# Patient Record
Sex: FEMALE | Race: OTHER | NOT HISPANIC OR LATINO
[De-identification: names, ages, dates, MRNs, and addresses within clinical notes are randomized per-mention and may not be internally consistent; named-entity substitution may affect disease eponyms.]

---

## 2021-01-11 ENCOUNTER — APPOINTMENT (OUTPATIENT)
Dept: PLASTIC SURGERY | Facility: CLINIC | Age: 56
End: 2021-01-11

## 2021-01-11 ENCOUNTER — OUTPATIENT (OUTPATIENT)
Dept: OUTPATIENT SERVICES | Facility: HOSPITAL | Age: 56
LOS: 1 days | End: 2021-01-11

## 2021-01-11 DIAGNOSIS — Z41.9 ENCOUNTER FOR PROCEDURE FOR PURPOSES OTHER THAN REMEDYING HEALTH STATE, UNSPECIFIED: ICD-10-CM

## 2021-01-11 PROBLEM — Z00.00 ENCOUNTER FOR PREVENTIVE HEALTH EXAMINATION: Status: ACTIVE | Noted: 2021-01-11

## 2021-01-11 NOTE — HISTORY OF PRESENT ILLNESS
[FreeTextEntry1] : [facility] Consultation\par \par ID\par 55-year-old, healthy BRCA+ female with right breast cancer. \par \par Cancer Profile:\par Pathology: Right Invasive Ductal Carcinoma (U/S-guided core needle)\par Imaging: MRI shows primary 1.2cm deep to clip and separate 1cm mass lateral to primary. Staging CT/PE pending\par Genetics: BRCA2+\par Tentative Plan: Bilateral Nipple Sparing Mastectomy, Right axillary lymph node biopsy and immediate breast reconstruction. \par \par HISTORY OF PRESENTING ILLNESS\par This patient discovered a right breast lump while in the shower 2 months ago and brought it to the attention of her family physician. A mammogram and ultrasound followed demonstrating a mass. Subsequent workup demonstrated invasive ductal carcinoma in the context of BRCA2 positivity. \par \par She was referred to Dr. Damon’s office where a plan was made to proceed with right therapeutic and left prophylactic nipple sparing mastectomy, in addition to right axillary lymph node dissection. She was then referred to our clinic to discuss her reconstructive options. \par \par PAST MEDICAL HISTORY: \par Hypertension (controlled medically)\par \par PAST SURGICAL HISTORY: \par Bilateral Breast Reduction (wise pattern)- 2004\par Jackeline De Lis Abdominoplasty and abdominal and back liposuction- 2004\par Umbilical hernia repair- 2017\par \par MEDICATIONS:\par Losartan\par \par ALLERGIES: \par None\par \par SOCIAL HISTORY: \par She is a nonsmoker. She smoked in the past but quit 3 years ago. No drug history and recreational drinking history. She is single with two female children (aged 37 and 21, both healthy and have not undergone BRCA testing). 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 Answers questions appropriately with the aid of a . Appears emotionally stable. \par OVERALL BREAST EXAMINATION\par Axillary lymphadenopathy	None palpable\par Evidence of Breast Mass	Palpable mass right breast\par Chest wall shape abnormality\par (pectus carinatum/excavatum, scoliosis, hemithorax asymmetry)	No abnormalities\par Breast Abnormality	Wise pattern scars, wide IMF scars, upper pole involution, bilateral dog ears to IMF incisions, bilateral axillary breast rolls\par Asymmetry	Right > Left\par Soft Tissue Quality	Normal\par Superior Pole Involution	Moderate/Severe\par Ptosis	Grade I\par Cleavage	Wide (cm)\par Areola Size	Small (3cm diameter)\par INDIVIDUAL BREAST TISSUE CHARACTERISTICS\par Linear Base Width	Left 14cm, Right 14cm\par Soft Tissue Pinch Test – Upper Pole 	Left 2cm, Right 2cm\par Anterior Pull Skin Stretch (<2cm= tight envelope)	Left 3cm, Right 3cm- Compliant skin envelope\par Nipple to IMF – Rest 	Left 11cm Right 11cm\par BREAST MEASURMENTS\par Sternal angle to nipple:	Left 24.5cm, Right 25cm\par Mid clavicular to nipple:	Left 25cm, Right 25cm\par Inter-nipple distance:	27 cm\par DONOR SITES\par Abdomen	Unusable- jackeline de lis abdominoplasty and insufficient tissue.\par Upper thighs	Ample fat, no scars. \par Back	Palpable Lats, good overlying skin quality. Paraspinal liposuction scars. \par \par IMAGING:\par Staging CT/PE Pending\par Concordant U/S and MRI right breast mass findings 11:00/12:00\par \par IMPRESSION/PLAN: \par \par This patient is a very pleasant Ghanaian speaking 55-year-old female who was referred to us by the breast surgery team for immediate breast reconstruction following a bilateral nipple sparing mastectomy (right therapeutic for IDC, left prophylactic for BRCA2). She is here today to learn about her reconstructive options. \par \par We met for over an hour today and discussed her reconstructive options in detail. Specifically, I went over the options of 1) closure of the breast envelope without reconstruction 2) Alloplastic, implant-based reconstruction and 3) Autologous, flap-based reconstruction. \par \par We began by going over the advantages and disadvantages of immediate vs delayed breast reconstruction. We explained that immediate breast reconstruction is beneficial because it preserves that native breast footprint and IMF which could potentially lead to better aesthetic result postoperatively. It is also beneficial because of 1 general anesthetic instead of 2, a faster time to completion of reconstruction and not having to live with a mastectomy defect. Disadvantages include the risk of mastectomy flap necrosis, excessive skin/nipple malposition in large breasts, delayed assessment of margins, unknown need for post mastectomy radiation and the potential for wounds delaying adjuvant therapy. Lastly, she was told that the complication rate is higher compared to delayed breast reconstruction. She demonstrated appropriate understanding of the risks and benefits and indicated to me that she wanted to proceed with immediate breast reconstruction. \par \par She was then described in detail the advantages and disadvantages of 1 stage for 2 stage and pre vs sub pectoral alloplastic breast reconstruction. We explained that direct-to-implant breast reconstruction is associated with a higher complication and secondary revision rate than tissue expander to implant reconstruction. More specifically, there is increased risk for mastectomy flap necrosis, seroma, infection and revisionary surgery. She was made aware the of our plan to use acellular dermal matrix, a cadaveric skin substitute, with inherent risks such as infection and seroma. Lastly, we went over the risks of using alloplastic devices. These included, but were not limited to: breast pain/breast implant illness, breast asymmetry, implant rupture, wrinkling/rippling, implant palpability/visibility, capsular contracture, wound dehiscence/implant exposure, abnormal scarring, changes to breast and nipple sensation, animation deformity, the need for explantation/revisionary surgery and the rare association of SHARON-ALCL in the context of textured devices. Lastly, we went over the FDA suggestion of an MRI 3 years following placement of silicone implants and up to every 2 years thereafter to monitor for implant rupture. She also indicated that she is aware that all breast implants eventually require replacement, which are thought to occur around 10 years, but can happen before or after this. \par \par She was then described in detail the advantages and disadvantages of autologous reconstruction. As she has undergone an abdominoplasty and therefore not a candidate for a VIKI Flap, our discussion pertained specifically to the profunda artery  (PAP) flap. I indicated to her that the procedure involves a longer surgery, hospital stay and postoperative recovery then implant based reconstruction. I explained that we will not be able to attain the same breast volume she has presently with the PAP flap alone and this will have to be augmented with fat grafting at a second stage. I then went over risks of the procedure which included, but was not limited to, flap failure, flap takeback, flap revision, pneumothorax, DVT/PE, breast asymmetry, fat necrosis, oil cysts, donor wound healing complications, unaesthetic donor site scars, seroma, hematoma and infection. \par \par Following this information, the patient indicated that she wanted to proceed with bilateral PAP flap reconstruction. She indicated she understood the risks of the procedure and displayed appropriate expectations about the aesthetic result attainable. We will follow up with her after her staging CT/PE and CT angiogram of the lower extremity for her final preoperative visit. At  this point we will answer any final questions and review of operative date and perioperative plan. \par \par Plan Summary\par 1.	Bilateral nipple sparing mastectomy (right therapeutic for IDC, left prophylactic for BRCA2+ and Right axillary lymph node dissection\par 2.	Bilateral PAP flap Breast Reconstruction\par \par Dictated by:\par Dr. French Salcido \par Plastics and Reconstructive Surgery\par [facility]

## 2021-01-12 PROBLEM — Z41.9 ELECTIVE SURGERY: Status: ACTIVE | Noted: 2021-01-12

## 2021-01-20 ENCOUNTER — TRANSCRIPTION ENCOUNTER (OUTPATIENT)
Age: 56
End: 2021-01-20

## 2021-01-20 ENCOUNTER — OUTPATIENT (OUTPATIENT)
Dept: OUTPATIENT SERVICES | Facility: HOSPITAL | Age: 56
LOS: 1 days | End: 2021-01-20
Payer: MEDICAID

## 2021-01-20 VITALS
HEART RATE: 68 BPM | OXYGEN SATURATION: 98 % | HEIGHT: 67 IN | SYSTOLIC BLOOD PRESSURE: 127 MMHG | RESPIRATION RATE: 18 BRPM | TEMPERATURE: 98 F | DIASTOLIC BLOOD PRESSURE: 76 MMHG | WEIGHT: 187.39 LBS

## 2021-01-20 DIAGNOSIS — Z98.890 OTHER SPECIFIED POSTPROCEDURAL STATES: Chronic | ICD-10-CM

## 2021-01-20 DIAGNOSIS — Z01.89 ENCOUNTER FOR OTHER SPECIFIED SPECIAL EXAMINATIONS: ICD-10-CM

## 2021-01-20 DIAGNOSIS — Z41.9 ENCOUNTER FOR PROCEDURE FOR PURPOSES OTHER THAN REMEDYING HEALTH STATE, UNSPECIFIED: Chronic | ICD-10-CM

## 2021-01-20 PROCEDURE — A9541: CPT

## 2021-01-20 PROCEDURE — 78195 LYMPH SYSTEM IMAGING: CPT

## 2021-01-20 PROCEDURE — 78195 LYMPH SYSTEM IMAGING: CPT | Mod: 26

## 2021-01-20 NOTE — PATIENT PROFILE ADULT - STATED REASON FOR ADMISSION
Bilateral mastectomy Simple complete Mathews node Dissection Inject Blue dye full axillary Dissection

## 2021-01-21 ENCOUNTER — RESULT REVIEW (OUTPATIENT)
Age: 56
End: 2021-01-21

## 2021-01-21 ENCOUNTER — NON-APPOINTMENT (OUTPATIENT)
Age: 56
End: 2021-01-21

## 2021-01-21 ENCOUNTER — INPATIENT (INPATIENT)
Facility: HOSPITAL | Age: 56
LOS: 2 days | Discharge: HOME HEALTH | DRG: 581 | End: 2021-01-24
Attending: SURGERY | Admitting: SURGERY
Payer: MEDICAID

## 2021-01-21 DIAGNOSIS — Z41.9 ENCOUNTER FOR PROCEDURE FOR PURPOSES OTHER THAN REMEDYING HEALTH STATE, UNSPECIFIED: Chronic | ICD-10-CM

## 2021-01-21 DIAGNOSIS — Z98.890 OTHER SPECIFIED POSTPROCEDURAL STATES: Chronic | ICD-10-CM

## 2021-01-21 LAB
ANION GAP SERPL CALC-SCNC: 12 MMOL/L — SIGNIFICANT CHANGE UP (ref 5–17)
BUN SERPL-MCNC: 11 MG/DL — SIGNIFICANT CHANGE UP (ref 7–23)
CALCIUM SERPL-MCNC: 8.9 MG/DL — SIGNIFICANT CHANGE UP (ref 8.4–10.5)
CHLORIDE SERPL-SCNC: 107 MMOL/L — SIGNIFICANT CHANGE UP (ref 96–108)
CO2 SERPL-SCNC: 24 MMOL/L — SIGNIFICANT CHANGE UP (ref 22–31)
CREAT SERPL-MCNC: 0.68 MG/DL — SIGNIFICANT CHANGE UP (ref 0.5–1.3)
GLUCOSE SERPL-MCNC: 178 MG/DL — HIGH (ref 70–99)
HCT VFR BLD CALC: 34.1 % — LOW (ref 34.5–45)
HGB BLD-MCNC: 11 G/DL — LOW (ref 11.5–15.5)
MCHC RBC-ENTMCNC: 29.2 PG — SIGNIFICANT CHANGE UP (ref 27–34)
MCHC RBC-ENTMCNC: 32.3 GM/DL — SIGNIFICANT CHANGE UP (ref 32–36)
MCV RBC AUTO: 90.5 FL — SIGNIFICANT CHANGE UP (ref 80–100)
NRBC # BLD: 0 /100 WBCS — SIGNIFICANT CHANGE UP (ref 0–0)
PLATELET # BLD AUTO: 232 K/UL — SIGNIFICANT CHANGE UP (ref 150–400)
POTASSIUM SERPL-MCNC: 4.3 MMOL/L — SIGNIFICANT CHANGE UP (ref 3.5–5.3)
POTASSIUM SERPL-SCNC: 4.3 MMOL/L — SIGNIFICANT CHANGE UP (ref 3.5–5.3)
RBC # BLD: 3.77 M/UL — LOW (ref 3.8–5.2)
RBC # FLD: 13.1 % — SIGNIFICANT CHANGE UP (ref 10.3–14.5)
SODIUM SERPL-SCNC: 143 MMOL/L — SIGNIFICANT CHANGE UP (ref 135–145)
WBC # BLD: 11.36 K/UL — HIGH (ref 3.8–10.5)
WBC # FLD AUTO: 11.36 K/UL — HIGH (ref 3.8–10.5)

## 2021-01-21 PROCEDURE — 58661 LAPAROSCOPY REMOVE ADNEXA: CPT | Mod: 50

## 2021-01-21 PROCEDURE — 76098 X-RAY EXAM SURGICAL SPECIMEN: CPT | Mod: 26

## 2021-01-21 PROCEDURE — 88305 TISSUE EXAM BY PATHOLOGIST: CPT | Mod: 26

## 2021-01-21 PROCEDURE — 88112 CYTOPATH CELL ENHANCE TECH: CPT | Mod: 26

## 2021-01-21 RX ORDER — SODIUM CHLORIDE 9 MG/ML
1000 INJECTION, SOLUTION INTRAVENOUS
Refills: 0 | Status: DISCONTINUED | OUTPATIENT
Start: 2021-01-21 | End: 2021-01-23

## 2021-01-21 RX ORDER — LANOLIN ALCOHOL/MO/W.PET/CERES
3 CREAM (GRAM) TOPICAL AT BEDTIME
Refills: 0 | Status: DISCONTINUED | OUTPATIENT
Start: 2021-01-21 | End: 2021-01-24

## 2021-01-21 RX ORDER — LOSARTAN POTASSIUM 100 MG/1
1 TABLET, FILM COATED ORAL
Qty: 0 | Refills: 0 | DISCHARGE

## 2021-01-21 RX ORDER — OXYCODONE HYDROCHLORIDE 5 MG/1
5 TABLET ORAL EVERY 4 HOURS
Refills: 0 | Status: DISCONTINUED | OUTPATIENT
Start: 2021-01-21 | End: 2021-01-24

## 2021-01-21 RX ORDER — HEPARIN SODIUM 5000 [USP'U]/ML
5000 INJECTION INTRAVENOUS; SUBCUTANEOUS EVERY 8 HOURS
Refills: 0 | Status: DISCONTINUED | OUTPATIENT
Start: 2021-01-21 | End: 2021-01-24

## 2021-01-21 RX ORDER — HYDROMORPHONE HYDROCHLORIDE 2 MG/ML
1 INJECTION INTRAMUSCULAR; INTRAVENOUS; SUBCUTANEOUS EVERY 4 HOURS
Refills: 0 | Status: DISCONTINUED | OUTPATIENT
Start: 2021-01-21 | End: 2021-01-22

## 2021-01-21 RX ORDER — ACETAMINOPHEN 500 MG
325 TABLET ORAL EVERY 4 HOURS
Refills: 0 | Status: DISCONTINUED | OUTPATIENT
Start: 2021-01-21 | End: 2021-01-22

## 2021-01-21 RX ORDER — BUPIVACAINE 13.3 MG/ML
20 INJECTION, SUSPENSION, LIPOSOMAL INFILTRATION ONCE
Refills: 0 | Status: DISCONTINUED | OUTPATIENT
Start: 2021-01-21 | End: 2021-01-24

## 2021-01-21 RX ADMIN — HYDROMORPHONE HYDROCHLORIDE 1 MILLIGRAM(S): 2 INJECTION INTRAMUSCULAR; INTRAVENOUS; SUBCUTANEOUS at 15:12

## 2021-01-21 RX ADMIN — Medication 3 MILLIGRAM(S): at 22:37

## 2021-01-21 RX ADMIN — HEPARIN SODIUM 5000 UNIT(S): 5000 INJECTION INTRAVENOUS; SUBCUTANEOUS at 22:37

## 2021-01-21 RX ADMIN — OXYCODONE HYDROCHLORIDE 5 MILLIGRAM(S): 5 TABLET ORAL at 18:53

## 2021-01-21 NOTE — PROGRESS NOTE ADULT - SUBJECTIVE AND OBJECTIVE BOX
GYN POC    Pt seen and examined at bedside. Pt complains of upper abdominal discomfort.   Pt denies any fever, chills, chest pain, SOB, nausea or vomiting.    T(F): 97.6 (01-21-21 @ 16:35), Max: 97.6 (01-21-21 @ 16:35)  HR: 99 (01-21-21 @ 16:35) (66 - 99)  BP: 124/76 (01-21-21 @ 16:35) (105/61 - 124/76)  RR: 15 (01-21-21 @ 16:35) (13 - 18)  SpO2: 94% (01-21-21 @ 16:35) (94% - 100%)  Wt(kg): --    01-21 @ 07:01  -  01-21 @ 17:56  --------------------------------------------------------  IN: 0 mL / OUT: 100 mL / NET: -100 mL        acetaminophen   Tablet .. 325 milliGRAM(s) Oral every 4 hours PRN Mild Pain (1 - 3), Moderate Pain (4 - 6)  BUpivacaine liposome 1.3% Injectable (no eMAR) 20 milliLiter(s) Local Injection once  heparin   Injectable 5000 Unit(s) SubCutaneous every 8 hours  HYDROmorphone  Injectable 1 milliGRAM(s) IV Push every 4 hours PRN Severe Pain (7 - 10)  lactated ringers. 1000 milliLiter(s) IV Continuous <Continuous>  oxyCODONE    IR 5 milliGRAM(s) Oral every 4 hours PRN Moderate Pain (4 - 6)      Physical exam:  Constitutional: NAD  Abdomen: dressings c/d/i on chest, 2 BL drains with sanguinous drainage, incision sites clean, dry and intact. Soft, mildly tender, nondistended.  Extremities: no lower extremity edema, or calve tenderness. SCDs in place

## 2021-01-21 NOTE — H&P ADULT - NSHPLABSRESULTS_GEN_ALL_CORE
preop labs:  1/13/2021:  Cr 0.54    INR 1.0    hgb 12.8  hct 39.1  plt 278    all radiology in paper chart

## 2021-01-21 NOTE — BRIEF OPERATIVE NOTE - OPERATION/FINDINGS
Aguilar catheter placed - 1 L urine clear  Diagnostic laparoscopy identified omental adhesions, right fallopian tube, bilateral ovaries, absent left fallopian tube  Ligasure device used to remove bilateral ovaries and right fallopian tube  Excellent hemostasis  EBL minimal

## 2021-01-21 NOTE — H&P ADULT - HISTORY OF PRESENT ILLNESS
Ms. Ann is a 54 yo female with history of prior breast reduction and HTN who presents with invasive ductal CA of right breast with focal micropapillary features, at right breat 10:30-11:00, 5-6 cm FN.  Ms. Ann is a 54 yo female with history of prior breast reduction, abdominoplasty, oophorectomy and HTN who presents with invasive ductal CA of right breast with focal micropapillary features, at right breat 10:30-11:00, 5-6 cm FN. She is also positive for BRCA2 mutation. Given BRCA2 status, she has elected for bilateral mastectomy and oophorectomy (she still has one) today, with right axillary sentinel lymph node biopsy. Ms. Ann is a 56 yo female with history of prior breast reduction, abdominoplasty, L salpingectomy, and HTN who presents with invasive ductal CA of right breast with focal micropapillary features, at right breat 10:30-11:00, 5-6 cm FN. She is also positive for BRCA2 mutation. Given BRCA2 status, she has elected for bilateral mastectomy and oophorectomy and salpingectomy today, with right axillary sentinel lymph node biopsy.

## 2021-01-21 NOTE — PROGRESS NOTE ADULT - ASSESSMENT
56 yo female with history of prior breast reduction, abdominoplasty, prior L salpingectomy, and HTN who presents with invasive ductal CA of right breast with focal micropapillary features, at right breat 10:30-11:00, 5-6 cm FN, BRCA2 mutation, presents for bilateral simple mastectomy with right seninel lymph node biopsy with plastics reconstruction and laparoscopic salpingo-oophorectomy today. Now s/p B/l Simple mastectomy with Right SLNB + Plastics Reconstruction+ Right Salpingectomy and b/l Oophrectomy    - SQH starting 11pm tonight if Hematocrit stable, SCDs, IS, OOBA  - Regular Diet, IVF@90  - JPx1 bilaterally  - Plastics, GYN following  - TOV at 10pm   - AM labs

## 2021-01-21 NOTE — BRIEF OPERATIVE NOTE - NSICDXBRIEFPROCEDURE_GEN_ALL_CORE_FT
PROCEDURES:  Right mastectomy with sentinel lymph node biopsy 21-Jan-2021 14:14:26  Ave Mae  Bilateral simple mastectomy 21-Jan-2021 14:13:32 with RIGHT sentinel lymph node biopsy [facility]

## 2021-01-21 NOTE — BRIEF OPERATIVE NOTE - NSICDXBRIEFPOSTOP_GEN_ALL_CORE_FT
POST-OP DIAGNOSIS:  BRCA positive 21-Jan-2021 14:03:18  Claudette Johnson  
POST-OP DIAGNOSIS:  Breast cancer, right 21-Jan-2021 14:14:06  Ave Mae

## 2021-01-21 NOTE — H&P ADULT - NSHPPHYSICALEXAM_GEN_ALL_CORE
PHYSICAL EXAM:  Neuro: sedated in OR  Resp: intubated in OR  CV: NSR  Abd: Soft, NT, ND, abdominoplasty incisions well healed c/d/i  Extr: WWP, no edema

## 2021-01-21 NOTE — PROGRESS NOTE ADULT - ASSESSMENT
A: 56yo F s/p L/S RSO and LO after BL simple mastectomy and R SLNB.  L/S RSO and LO uncomplicated.  Patient hemodynamically and clinically stable.    Management per primary team  F/U TOV

## 2021-01-21 NOTE — BRIEF OPERATIVE NOTE - NSICDXBRIEFPREOP_GEN_ALL_CORE_FT
PRE-OP DIAGNOSIS:  Breast cancer, right 21-Jan-2021 14:13:51  Aev Mae  BRCA positive 21-Jan-2021 14:03:09  Claudette Johnson  
PRE-OP DIAGNOSIS:  BRCA positive 21-Jan-2021 14:03:09  Claudette Johnson

## 2021-01-21 NOTE — H&P ADULT - NSICDXPASTSURGICALHX_GEN_ALL_CORE_FT
PAST SURGICAL HISTORY:  S/P abdominoplasty w/ b/l breast reduction    S/P hernia repair umbilical    Surgery, elective liposuction

## 2021-01-21 NOTE — BRIEF OPERATIVE NOTE - OPERATION/FINDINGS
Right and left simple mastectomy flaps raised through lollipop incision on bilateral breasts, extended to all 4 margins of the breast. Right sentinel lymph node biopsy completed by locating lymph nodes which were palpable and also with signal from Navigator. Right and left simple mastectomy flaps raised through lollipop incision on bilateral breasts, extended to all 4 margins of the breast. Right sentinel lymph node biopsy completed by locating lymph nodes which were palpable and also with signal from Navigator. Plastic surgery then completed the subpectoral tissue expanders and closure portion of the case, followed by gyn for the R salpingo-ooporectomy and L oophorectomy.

## 2021-01-21 NOTE — H&P ADULT - ASSESSMENT
56 yo female with history of prior breast reduction, abdominoplasty, oophorectomy and HTN who presents with invasive ductal CA of right breast with focal micropapillary features, at right breat 10:30-11:00, 5-6 cm FN, BRCA2 mutation, presents for bilateral simple mastectomy with right seninel lymph node biopsy with plastics reconstruction and laparoscopic oophorectomy today.    Post operative, admit to Team 5, Dr. Damon  Pain control  Nausea control  Intake/output  Remaining plan pending OR 56 yo female with history of prior breast reduction, abdominoplasty, prior L salpingectomy, and HTN who presents with invasive ductal CA of right breast with focal micropapillary features, at right breat 10:30-11:00, 5-6 cm FN, BRCA2 mutation, presents for bilateral simple mastectomy with right seninel lymph node biopsy with plastics reconstruction and laparoscopic salpingo-oophorectomy today.    Post operative, admit to Team 5, Dr. Damon  Post op labs  Pain control  Nausea control  Intake/output  Remaining plan pending OR

## 2021-01-21 NOTE — BRIEF OPERATIVE NOTE - NSICDXBRIEFPROCEDURE_GEN_ALL_CORE_FT
PROCEDURES:  Laparoscopic oophorectomy, right 21-Jan-2021 14:02:59  Claudette Johnson  Salpingectomy, bilateral, total, laparoscopic 21-Jan-2021 14:02:46  Claudette Johnson

## 2021-01-21 NOTE — PROGRESS NOTE ADULT - SUBJECTIVE AND OBJECTIVE BOX
Pre-Op Dx: BRCA 2, Rt Breast IDC     Procedure: a) Right sentinel lymph node biopsy,   b) Bilateral simple mastectomy,   c) Laparoscopic oophorectomy, bilateral  d) Salpingectomy, right      Surgeon:  Dr Damon    Subjective: Pt seen and examined bedside, speak Khmer but understands some english. Feels okay, no acute events.  Pain and nausea under control, tolerated a few sips of water. No complain of dizziness/palpitations.      Vital Signs Last 24 Hrs  T(C): 36.4 (21 Jan 2021 16:35), Max: 36.4 (21 Jan 2021 16:35)  T(F): 97.6 (21 Jan 2021 16:35), Max: 97.6 (21 Jan 2021 16:35)  HR: 99 (21 Jan 2021 16:35) (66 - 99)  BP: 124/76 (21 Jan 2021 16:35) (105/61 - 124/76)  BP(mean): 84 (21 Jan 2021 15:20) (75 - 84)  RR: 15 (21 Jan 2021 16:35) (13 - 18)  SpO2: 94% (21 Jan 2021 16:35) (94% - 100%)    Physical Exam:  General: NAD, resting comfortably in bed  Pulmonary: Nonlabored breathing, no respiratory distress  Cardiovascular: NSR, normotensive and normocardic  CHEST: SurgiBra on, Bilateral dressing present, c/d/i, GEO drains x1 bilaterally showing serosanguinous fluid   Abdominal: soft, mildly distended, non tender to palpation   Extremities: WWP, normal strength  Neuro: A/O x 3, CNs II-XII grossly intact, no focal deficits, normal sensation  Pulses: palpable distal pulses      LABS:                        11.0   11.36 )-----------( 232      ( 21 Jan 2021 14:55 )             34.1     01-21    143  |  107  |  11  ----------------------------<  178<H>  4.3   |  24  |  0.68    Ca    8.9      21 Jan 2021 14:55       Pre-Op Dx: BRCA 2, Rt Breast IDC     Procedure: a) Right sentinel lymph node biopsy,   b) Bilateral simple mastectomy,   c) Laparoscopic oophorectomy, bilateral  d) Salpingectomy, right      Surgeon:  Dr Damon    Subjective: Pt seen and examined bedside, speaks Croatian but understands some english. Feels okay, no acute events.  Pain and nausea under control, tolerated a few sips of water. No complain of dizziness/palpitations.      Vital Signs Last 24 Hrs  T(C): 36.4 (21 Jan 2021 16:35), Max: 36.4 (21 Jan 2021 16:35)  T(F): 97.6 (21 Jan 2021 16:35), Max: 97.6 (21 Jan 2021 16:35)  HR: 99 (21 Jan 2021 16:35) (66 - 99)  BP: 124/76 (21 Jan 2021 16:35) (105/61 - 124/76)  BP(mean): 84 (21 Jan 2021 15:20) (75 - 84)  RR: 15 (21 Jan 2021 16:35) (13 - 18)  SpO2: 94% (21 Jan 2021 16:35) (94% - 100%)    Physical Exam:  General: NAD, resting comfortably in bed  Pulmonary: Nonlabored breathing, no respiratory distress  Cardiovascular: NSR, normotensive and normocardic  CHEST: SurgiBra on, Bilateral dressing present, c/d/i, GEO drains x1 bilaterally showing serosanguinous fluid   Abdominal: soft, mildly distended, non tender to palpation, incisions c/d/i  Extremities: WWP, normal strength  Neuro: A/O x 3, CNs II-XII grossly intact, no focal deficits, normal sensation  Pulses: palpable distal pulses      LABS:                        11.0   11.36 )-----------( 232      ( 21 Jan 2021 14:55 )             34.1     01-21    143  |  107  |  11  ----------------------------<  178<H>  4.3   |  24  |  0.68    Ca    8.9      21 Jan 2021 14:55

## 2021-01-22 ENCOUNTER — TRANSCRIPTION ENCOUNTER (OUTPATIENT)
Age: 56
End: 2021-01-22

## 2021-01-22 LAB
ANION GAP SERPL CALC-SCNC: 11 MMOL/L — SIGNIFICANT CHANGE UP (ref 5–17)
BASOPHILS # BLD AUTO: 0.01 K/UL — SIGNIFICANT CHANGE UP (ref 0–0.2)
BASOPHILS NFR BLD AUTO: 0.1 % — SIGNIFICANT CHANGE UP (ref 0–2)
BUN SERPL-MCNC: 8 MG/DL — SIGNIFICANT CHANGE UP (ref 7–23)
CALCIUM SERPL-MCNC: 8.9 MG/DL — SIGNIFICANT CHANGE UP (ref 8.4–10.5)
CHLORIDE SERPL-SCNC: 107 MMOL/L — SIGNIFICANT CHANGE UP (ref 96–108)
CO2 SERPL-SCNC: 25 MMOL/L — SIGNIFICANT CHANGE UP (ref 22–31)
CREAT SERPL-MCNC: 0.58 MG/DL — SIGNIFICANT CHANGE UP (ref 0.5–1.3)
EOSINOPHIL # BLD AUTO: 0.01 K/UL — SIGNIFICANT CHANGE UP (ref 0–0.5)
EOSINOPHIL NFR BLD AUTO: 0.1 % — SIGNIFICANT CHANGE UP (ref 0–6)
GLUCOSE SERPL-MCNC: 129 MG/DL — HIGH (ref 70–99)
HCT VFR BLD CALC: 30.6 % — LOW (ref 34.5–45)
HCV AB S/CO SERPL IA: 0.11 S/CO — SIGNIFICANT CHANGE UP
HCV AB SERPL-IMP: SIGNIFICANT CHANGE UP
HGB BLD-MCNC: 10 G/DL — LOW (ref 11.5–15.5)
IMM GRANULOCYTES NFR BLD AUTO: 0.3 % — SIGNIFICANT CHANGE UP (ref 0–1.5)
LYMPHOCYTES # BLD AUTO: 2.17 K/UL — SIGNIFICANT CHANGE UP (ref 1–3.3)
LYMPHOCYTES # BLD AUTO: 23.5 % — SIGNIFICANT CHANGE UP (ref 13–44)
MAGNESIUM SERPL-MCNC: 1.7 MG/DL — SIGNIFICANT CHANGE UP (ref 1.6–2.6)
MCHC RBC-ENTMCNC: 30.1 PG — SIGNIFICANT CHANGE UP (ref 27–34)
MCHC RBC-ENTMCNC: 32.7 GM/DL — SIGNIFICANT CHANGE UP (ref 32–36)
MCV RBC AUTO: 92.2 FL — SIGNIFICANT CHANGE UP (ref 80–100)
MONOCYTES # BLD AUTO: 0.65 K/UL — SIGNIFICANT CHANGE UP (ref 0–0.9)
MONOCYTES NFR BLD AUTO: 7 % — SIGNIFICANT CHANGE UP (ref 2–14)
NEUTROPHILS # BLD AUTO: 6.38 K/UL — SIGNIFICANT CHANGE UP (ref 1.8–7.4)
NEUTROPHILS NFR BLD AUTO: 69 % — SIGNIFICANT CHANGE UP (ref 43–77)
NRBC # BLD: 0 /100 WBCS — SIGNIFICANT CHANGE UP (ref 0–0)
PHOSPHATE SERPL-MCNC: 3.2 MG/DL — SIGNIFICANT CHANGE UP (ref 2.5–4.5)
PLATELET # BLD AUTO: 214 K/UL — SIGNIFICANT CHANGE UP (ref 150–400)
POTASSIUM SERPL-MCNC: 3.9 MMOL/L — SIGNIFICANT CHANGE UP (ref 3.5–5.3)
POTASSIUM SERPL-SCNC: 3.9 MMOL/L — SIGNIFICANT CHANGE UP (ref 3.5–5.3)
RBC # BLD: 3.32 M/UL — LOW (ref 3.8–5.2)
RBC # FLD: 13.2 % — SIGNIFICANT CHANGE UP (ref 10.3–14.5)
SODIUM SERPL-SCNC: 143 MMOL/L — SIGNIFICANT CHANGE UP (ref 135–145)
WBC # BLD: 9.25 K/UL — SIGNIFICANT CHANGE UP (ref 3.8–10.5)
WBC # FLD AUTO: 9.25 K/UL — SIGNIFICANT CHANGE UP (ref 3.8–10.5)

## 2021-01-22 PROCEDURE — 88307 TISSUE EXAM BY PATHOLOGIST: CPT | Mod: 26

## 2021-01-22 PROCEDURE — 88342 IMHCHEM/IMCYTCHM 1ST ANTB: CPT | Mod: 26

## 2021-01-22 PROCEDURE — 88305 TISSUE EXAM BY PATHOLOGIST: CPT | Mod: 26,59

## 2021-01-22 RX ORDER — ONDANSETRON 8 MG/1
4 TABLET, FILM COATED ORAL ONCE
Refills: 0 | Status: COMPLETED | OUTPATIENT
Start: 2021-01-22 | End: 2021-01-22

## 2021-01-22 RX ORDER — MAGNESIUM SULFATE 500 MG/ML
2 VIAL (ML) INJECTION ONCE
Refills: 0 | Status: COMPLETED | OUTPATIENT
Start: 2021-01-22 | End: 2021-01-22

## 2021-01-22 RX ORDER — ONDANSETRON 8 MG/1
4 TABLET, FILM COATED ORAL EVERY 6 HOURS
Refills: 0 | Status: DISCONTINUED | OUTPATIENT
Start: 2021-01-22 | End: 2021-01-23

## 2021-01-22 RX ORDER — LOSARTAN POTASSIUM 100 MG/1
25 TABLET, FILM COATED ORAL DAILY
Refills: 0 | Status: DISCONTINUED | OUTPATIENT
Start: 2021-01-22 | End: 2021-01-24

## 2021-01-22 RX ORDER — OXYCODONE HYDROCHLORIDE 5 MG/1
10 TABLET ORAL EVERY 6 HOURS
Refills: 0 | Status: DISCONTINUED | OUTPATIENT
Start: 2021-01-22 | End: 2021-01-24

## 2021-01-22 RX ORDER — ACETAMINOPHEN 500 MG
650 TABLET ORAL EVERY 6 HOURS
Refills: 0 | Status: DISCONTINUED | OUTPATIENT
Start: 2021-01-22 | End: 2021-01-24

## 2021-01-22 RX ORDER — DIAZEPAM 5 MG
5 TABLET ORAL
Refills: 0 | Status: DISCONTINUED | OUTPATIENT
Start: 2021-01-22 | End: 2021-01-22

## 2021-01-22 RX ADMIN — LOSARTAN POTASSIUM 25 MILLIGRAM(S): 100 TABLET, FILM COATED ORAL at 22:11

## 2021-01-22 RX ADMIN — ONDANSETRON 4 MILLIGRAM(S): 8 TABLET, FILM COATED ORAL at 22:13

## 2021-01-22 RX ADMIN — ONDANSETRON 4 MILLIGRAM(S): 8 TABLET, FILM COATED ORAL at 19:47

## 2021-01-22 RX ADMIN — OXYCODONE HYDROCHLORIDE 5 MILLIGRAM(S): 5 TABLET ORAL at 15:00

## 2021-01-22 RX ADMIN — HEPARIN SODIUM 5000 UNIT(S): 5000 INJECTION INTRAVENOUS; SUBCUTANEOUS at 22:11

## 2021-01-22 RX ADMIN — OXYCODONE HYDROCHLORIDE 5 MILLIGRAM(S): 5 TABLET ORAL at 09:08

## 2021-01-22 RX ADMIN — Medication 3 MILLIGRAM(S): at 22:11

## 2021-01-22 RX ADMIN — Medication 5 MILLIGRAM(S): at 17:50

## 2021-01-22 RX ADMIN — HEPARIN SODIUM 5000 UNIT(S): 5000 INJECTION INTRAVENOUS; SUBCUTANEOUS at 14:59

## 2021-01-22 RX ADMIN — Medication 50 GRAM(S): at 09:57

## 2021-01-22 RX ADMIN — OXYCODONE HYDROCHLORIDE 5 MILLIGRAM(S): 5 TABLET ORAL at 22:23

## 2021-01-22 RX ADMIN — HEPARIN SODIUM 5000 UNIT(S): 5000 INJECTION INTRAVENOUS; SUBCUTANEOUS at 06:12

## 2021-01-22 RX ADMIN — OXYCODONE HYDROCHLORIDE 5 MILLIGRAM(S): 5 TABLET ORAL at 03:24

## 2021-01-22 NOTE — DISCHARGE NOTE PROVIDER - CARE PROVIDER_API CALL
French Salcido)  Surgery  Plastic  [address] [address]  Phone: [phone]  Fax: [phone]  Follow Up Time:     Puma Damon)  Surgery  [address] [place] [address], [address]  [place] [address]  Phone: [phone]  Fax: [phone]  Follow Up Time:

## 2021-01-22 NOTE — PROGRESS NOTE ADULT - SUBJECTIVE AND OBJECTIVE BOX
ON: Failed TOV--> straight cath, pain which was controlled with medication    SUBJECTIVE: Patient seen and examined bedside by chief resident, feels well, no acute complaints. Passed TOV this morning. Is yet to have solid food for breakfast, will follow up toleration. Ambulated with assistance, complains of mild dizziness. Endorses pain in breast incisions. Is able to move arms freely.     heparin   Injectable 5000 Unit(s) SubCutaneous every 8 hours    MEDICATIONS  (PRN):  acetaminophen   Tablet .. 325 milliGRAM(s) Oral every 4 hours PRN Mild Pain (1 - 3), Moderate Pain (4 - 6)  HYDROmorphone  Injectable 1 milliGRAM(s) IV Push every 4 hours PRN Severe Pain (7 - 10)  ondansetron    Tablet 4 milliGRAM(s) Oral every 6 hours PRN Nausea and/or Vomiting  oxyCODONE    IR 5 milliGRAM(s) Oral every 4 hours PRN Moderate Pain (4 - 6)    I&O's Detail    21 Jan 2021 07:01  -  22 Jan 2021 07:00  --------------------------------------------------------  IN:  Total IN: 0 mL    OUT:    Bulb (mL): 135 mL    Bulb (mL): 75 mL    Intermittent Catheterization - Urethral (mL): 750 mL    Post-Void Residual per Intermittent Catheterization (mL): 30 mL  Total OUT: 990 mL    Total NET: -990 mL      22 Jan 2021 07:01  -  22 Jan 2021 13:24  --------------------------------------------------------  IN:    IV PiggyBack: 100 mL    Lactated Ringers: 270 mL  Total IN: 370 mL    OUT:    Bulb (mL): 35 mL    Bulb (mL): 10 mL    Voided (mL): 800 mL  Total OUT: 845 mL    Total NET: -475 mL    Vital Signs Last 24 Hrs  T(C): 37.2 (22 Jan 2021 08:35), Max: 37.6 (22 Jan 2021 05:10)  T(F): 98.9 (22 Jan 2021 08:35), Max: 99.6 (22 Jan 2021 05:10)  HR: 89 (22 Jan 2021 10:00) (66 - 100)  BP: 147/74 (22 Jan 2021 10:00) (105/61 - 149/81)  BP(mean): 84 (21 Jan 2021 15:20) (75 - 84)  RR: 15 (22 Jan 2021 10:00) (13 - 18)  SpO2: 92% (22 Jan 2021 10:00) (90% - 100%)    Physical Exam:  General: NAD, resting comfortably in bed  Pulmonary: Nonlabored breathing, no respiratory distress  Cardiovascular: NSR, normotensive and normocardic  CHEST: SurgiBra on, Bilateral dressing present, c/d/i, GEO drains x1 bilaterally showing serosanguinous fluid   Abdominal: soft, mildly distended, non tender to palpation, incisions from GYN surgery c/d/i, prior abdominoplasty incisions appreciated  Extremities: WWP, normal strength  Neuro: A/O x 3, CNs II-XII grossly intact, no focal deficits, normal sensation  Pulses: palpable distal pulses    LABS:                        10.0   9.25  )-----------( 214      ( 22 Jan 2021 07:14 )             30.6     01-22    143  |  107  |  8   ----------------------------<  129<H>  3.9   |  25  |  0.58    Ca    8.9      22 Jan 2021 07:14  Phos  3.2     01-22  Mg     1.7     01-22            RADIOLOGY & ADDITIONAL STUDIES:

## 2021-01-22 NOTE — PROGRESS NOTE ADULT - ASSESSMENT
55y female POD#1  s/p l/s RSO and LO, b/l mastectomy and R SNLD, uncomplicated     1. Neuro: pain management per primary team  2. Pulm: Encourage IS and ambulation  3. Diet: Regular diet  4. : Voiding   5. DVT ppx: encourage ambulation, SCDs    55y female POD#1  s/p l/s RSO and LO, b/l mastectomy and R SNLD, uncomplicated     1. Neuro: pain management per primary team  2. Pulm: Encourage IS and ambulation  3. Diet: Regular diet  4. : Voiding   5. DVT ppx: encourage ambulation, SCDs     ADDENDUM 1/22 @12:48  - Patient stable meeting post op milestones from GYN standpoint, seen this am   - GYN signing off   - Please reconsult if necessary

## 2021-01-22 NOTE — PROGRESS NOTE ADULT - ASSESSMENT
54 yo female with history of prior breast reduction, abdominoplasty, prior L salpingectomy, and HTN who presents with invasive ductal CA of right breast with focal micropapillary features, at right breat 10:30-11:00, 5-6 cm FN, BRCA2 mutation, presents for bilateral simple mastectomy with right seninel lymph node biopsy with plastics reconstruction and laparoscopic salpingo-oophorectomy today. Now s/p B/l Simple mastectomy with Right SLNB + Plastics Reconstruction+ Right Salpingectomy and b/l Oophrectomy    - Regular Diet  - JPx1 bilaterally, GEO Care instructions, f/u output  - Plastics, GYN following  - AM labs  - d/c 1/23 AM with VNS for GEO care (SW aware)

## 2021-01-22 NOTE — DISCHARGE NOTE PROVIDER - HOSPITAL COURSE
56 yo female with history of prior breast reduction, abdominoplasty, prior L salpingectomy, and HTN who presents with invasive ductal CA of right breast with focal micropapillary features, at right breat 10:30-11:00, 5-6 cm FN, BRCA2 mutation, presents for bilateral simple mastectomy with right sentinel lymph node biopsy with plastics reconstruction and laparoscopic salpingo-oophorectomy now s/p B/l Simple mastectomy with Right SLNB + Plastics Reconstruction+ Right Salpingectomy and b/l Oophrectomy (1/21/21).    No acute events overnight. Pt is breathing comfortably on room air. Tolerating diet with no nausea/vomiting. Pain well controlled on current regimen. Ready for discharge home.

## 2021-01-22 NOTE — PROGRESS NOTE ADULT - SUBJECTIVE AND OBJECTIVE BOX
Patient evaluated at bedside. She c/o discomfort at her breasts, denies abdominal pain. She has not yet passed gas.  She is voiding and is ambulating to the restroom with assistance. She denies HA, dizziness, SOB, CP, palpitations, n/v.    T(C): 37.6 (01-22-21 @ 05:10), Max: 37.6 (01-22-21 @ 05:10)  HR: 94 (01-22-21 @ 05:10) (89 - 100)  BP: 149/81 (01-22-21 @ 05:10) (121/67 - 149/81)  RR: 17 (01-22-21 @ 05:10) (16 - 17)  SpO2: 94% (01-22-21 @ 05:10) (90% - 95%)    GA: no acute distress  Resp: normal respiratory effort  Dressings c/d/i with b/l drains with sangious drainage  Abd: +BS, soft, NT/ND, incisions c/d/i, no rebound or guarding  Extrem: SCDs in place, no LE edema       01-21 @ 07:01  -  01-22 @ 07:00  --------------------------------------------------------  IN: 0 mL / OUT: 990 mL / NET: -990 mL                              11.0   11.36 )-----------( 232      ( 21 Jan 2021 14:55 )             34.1     01-21    143  |  107  |  11  ----------------------------<  178<H>  4.3   |  24  |  0.68    Ca    8.9      21 Jan 2021 14:55

## 2021-01-22 NOTE — DISCHARGE NOTE PROVIDER - NSDCMRMEDTOKEN_GEN_ALL_CORE_FT
losartan 25 mg oral tablet: 1 tab(s) orally once a day  Tylenol PM: orally once a day, As Needed   Keflex 500 mg oral capsule: 1 cap(s) orally every 6 hours   losartan 25 mg oral tablet: 1 tab(s) orally once a day  oxycodone-acetaminophen 5 mg-325 mg oral tablet: 1 tab(s) orally every 6 hours MDD:4 tabs/day  Tylenol PM: orally once a day, As Needed

## 2021-01-22 NOTE — PROGRESS NOTE ADULT - SUBJECTIVE AND OBJECTIVE BOX
Plastic Surgery Progress Note    Procedure: a) Right sentinel lymph node biopsy,   b) Bilateral simple mastectomy,   c) Laparoscopic oophorectomy, bilateral  d) Salpingectomy, right      Surgeon:  Dr Damon/Lerman/Loly    Subjective: Pt seen and examined bedside. No acute events overnight.  Pain controlled. Denies dizziness/nausea/vomiting/CP/SOB    Vital Signs Last 24 Hrs  T(C): 37.6 (22 Jan 2021 05:10), Max: 37.6 (22 Jan 2021 05:10)  T(F): 99.6 (22 Jan 2021 05:10), Max: 99.6 (22 Jan 2021 05:10)  HR: 94 (22 Jan 2021 05:10) (66 - 100)  BP: 149/81 (22 Jan 2021 05:10) (105/61 - 149/81)  BP(mean): 84 (21 Jan 2021 15:20) (75 - 84)  RR: 17 (22 Jan 2021 05:10) (13 - 18)  SpO2: 94% (22 Jan 2021 05:10) (90% - 100%)    Physical Exam:  General: NAD, resting comfortably in bed  Pulmonary: Nonlabored breathing, no respiratory distress  Cardiovascular: NSR, normotensive and normocardic  CHEST: SurgiBra on, Bilateral dressing present, c/d/i, GEO drains x1 bilaterally showing serosanguinous fluid   Abdominal: soft, mildly distended, non tender to palpation, incisions c/d/i  Extremities: WWP, normal strength  Neuro: A/O x 3, CNs II-XII grossly intact, no focal deficits, normal sensation  Pulses: palpable distal pulses    A/P: 56 yo female s/p B/l Simple mastectomy with Right SLNB + Plastics Reconstruction+ Right Salpingectomy and b/l Oophrectomy  - Pain Controlled  - Stable  - DVT PPx: as per primary  - F/u labs  - F/u Drain output  - F/u GYN

## 2021-01-23 LAB
ANION GAP SERPL CALC-SCNC: 9 MMOL/L — SIGNIFICANT CHANGE UP (ref 5–17)
BUN SERPL-MCNC: 7 MG/DL — SIGNIFICANT CHANGE UP (ref 7–23)
CALCIUM SERPL-MCNC: 9.9 MG/DL — SIGNIFICANT CHANGE UP (ref 8.4–10.5)
CHLORIDE SERPL-SCNC: 100 MMOL/L — SIGNIFICANT CHANGE UP (ref 96–108)
CO2 SERPL-SCNC: 29 MMOL/L — SIGNIFICANT CHANGE UP (ref 22–31)
CREAT SERPL-MCNC: 0.49 MG/DL — LOW (ref 0.5–1.3)
GLUCOSE SERPL-MCNC: 122 MG/DL — HIGH (ref 70–99)
HCT VFR BLD CALC: 33.3 % — LOW (ref 34.5–45)
HGB BLD-MCNC: 10.5 G/DL — LOW (ref 11.5–15.5)
MAGNESIUM SERPL-MCNC: 2.1 MG/DL — SIGNIFICANT CHANGE UP (ref 1.6–2.6)
MCHC RBC-ENTMCNC: 29.3 PG — SIGNIFICANT CHANGE UP (ref 27–34)
MCHC RBC-ENTMCNC: 31.5 GM/DL — LOW (ref 32–36)
MCV RBC AUTO: 93 FL — SIGNIFICANT CHANGE UP (ref 80–100)
NRBC # BLD: 0 /100 WBCS — SIGNIFICANT CHANGE UP (ref 0–0)
PHOSPHATE SERPL-MCNC: 2.8 MG/DL — SIGNIFICANT CHANGE UP (ref 2.5–4.5)
PLATELET # BLD AUTO: 222 K/UL — SIGNIFICANT CHANGE UP (ref 150–400)
POTASSIUM SERPL-MCNC: 3.7 MMOL/L — SIGNIFICANT CHANGE UP (ref 3.5–5.3)
POTASSIUM SERPL-SCNC: 3.7 MMOL/L — SIGNIFICANT CHANGE UP (ref 3.5–5.3)
RBC # BLD: 3.58 M/UL — LOW (ref 3.8–5.2)
RBC # FLD: 13.1 % — SIGNIFICANT CHANGE UP (ref 10.3–14.5)
SODIUM SERPL-SCNC: 138 MMOL/L — SIGNIFICANT CHANGE UP (ref 135–145)
WBC # BLD: 9.23 K/UL — SIGNIFICANT CHANGE UP (ref 3.8–10.5)
WBC # FLD AUTO: 9.23 K/UL — SIGNIFICANT CHANGE UP (ref 3.8–10.5)

## 2021-01-23 RX ORDER — POTASSIUM CHLORIDE 20 MEQ
20 PACKET (EA) ORAL ONCE
Refills: 0 | Status: DISCONTINUED | OUTPATIENT
Start: 2021-01-23 | End: 2021-01-23

## 2021-01-23 RX ORDER — ONDANSETRON 8 MG/1
4 TABLET, FILM COATED ORAL EVERY 6 HOURS
Refills: 0 | Status: DISCONTINUED | OUTPATIENT
Start: 2021-01-23 | End: 2021-01-24

## 2021-01-23 RX ORDER — SCOPALAMINE 1 MG/3D
1 PATCH, EXTENDED RELEASE TRANSDERMAL
Refills: 0 | Status: DISCONTINUED | OUTPATIENT
Start: 2021-01-23 | End: 2021-01-24

## 2021-01-23 RX ORDER — POTASSIUM CHLORIDE 20 MEQ
10 PACKET (EA) ORAL
Refills: 0 | Status: COMPLETED | OUTPATIENT
Start: 2021-01-23 | End: 2021-01-23

## 2021-01-23 RX ADMIN — HEPARIN SODIUM 5000 UNIT(S): 5000 INJECTION INTRAVENOUS; SUBCUTANEOUS at 22:14

## 2021-01-23 RX ADMIN — Medication 100 MILLIEQUIVALENT(S): at 11:14

## 2021-01-23 RX ADMIN — LOSARTAN POTASSIUM 25 MILLIGRAM(S): 100 TABLET, FILM COATED ORAL at 22:14

## 2021-01-23 RX ADMIN — ONDANSETRON 4 MILLIGRAM(S): 8 TABLET, FILM COATED ORAL at 04:38

## 2021-01-23 RX ADMIN — Medication 100 MILLIEQUIVALENT(S): at 13:27

## 2021-01-23 RX ADMIN — HEPARIN SODIUM 5000 UNIT(S): 5000 INJECTION INTRAVENOUS; SUBCUTANEOUS at 14:42

## 2021-01-23 RX ADMIN — ONDANSETRON 4 MILLIGRAM(S): 8 TABLET, FILM COATED ORAL at 09:52

## 2021-01-23 RX ADMIN — Medication 650 MILLIGRAM(S): at 22:14

## 2021-01-23 RX ADMIN — Medication 650 MILLIGRAM(S): at 06:17

## 2021-01-23 RX ADMIN — Medication 650 MILLIGRAM(S): at 11:14

## 2021-01-23 RX ADMIN — Medication 650 MILLIGRAM(S): at 01:36

## 2021-01-23 RX ADMIN — Medication 3 MILLIGRAM(S): at 22:14

## 2021-01-23 RX ADMIN — Medication 650 MILLIGRAM(S): at 17:53

## 2021-01-23 RX ADMIN — SCOPALAMINE 1 PATCH: 1 PATCH, EXTENDED RELEASE TRANSDERMAL at 14:41

## 2021-01-23 RX ADMIN — HEPARIN SODIUM 5000 UNIT(S): 5000 INJECTION INTRAVENOUS; SUBCUTANEOUS at 06:17

## 2021-01-23 RX ADMIN — SCOPALAMINE 1 PATCH: 1 PATCH, EXTENDED RELEASE TRANSDERMAL at 18:15

## 2021-01-23 NOTE — PROGRESS NOTE ADULT - ASSESSMENT
54 yo female with history of prior breast reduction, abdominoplasty, prior L salpingectomy, and HTN who presents with invasive ductal CA of right breast with focal micropapillary features, at right breat 10:30-11:00, 5-6 cm FN, BRCA2 mutation, presents for bilateral simple mastectomy with right sentinel lymph node biopsy with plastics reconstruction and laparoscopic salpingo-oophorectomy now s/p B/l Simple mastectomy with Right SLNB + Plastics Reconstruction+ Right Salpingectomy and b/l Oophrectomy (1/21/21).    Regular Diet  JPx1 bilaterally, GEO Care instructions, f/u output  F/u AM labs  Plastics, GYN following  AM labs  D/c 1/23 AM with VNS for GEO care

## 2021-01-23 NOTE — PROGRESS NOTE ADULT - SUBJECTIVE AND OBJECTIVE BOX
SUBJECTIVE: Patient seen and examined bedside. Resting comfortably in bed. Reporting some nausea with emesis with regular diet yesterday. Also had episode of dizziness when ambulating to the restroom. Reports complete resolution of b/l upper extremity numbness. Has some pain along chest    heparin   Injectable 5000 Unit(s) SubCutaneous every 8 hours  losartan 25 milliGRAM(s) Oral daily      Vital Signs Last 24 Hrs  T(C): 37 (23 Jan 2021 08:26), Max: 37.4 (22 Jan 2021 20:15)  T(F): 98.6 (23 Jan 2021 08:26), Max: 99.3 (22 Jan 2021 20:15)  HR: 93 (23 Jan 2021 08:26) (84 - 93)  BP: 154/84 (23 Jan 2021 08:26) (154/82 - 165/89)  BP(mean): --  RR: 15 (23 Jan 2021 08:26) (15 - 17)  SpO2: 94% (23 Jan 2021 08:26) (93% - 97%)  I&O's Detail    22 Jan 2021 07:01  -  23 Jan 2021 07:00  --------------------------------------------------------  IN:    IV PiggyBack: 50 mL    Lactated Ringers: 720 mL    Oral Fluid: 480 mL  Total IN: 1250 mL    OUT:    Bulb (mL): 117 mL    Bulb (mL): 70 mL    Voided (mL): 3000 mL  Total OUT: 3187 mL    Total NET: -1937 mL      23 Jan 2021 07:01  -  23 Jan 2021 10:29  --------------------------------------------------------  IN:    Oral Fluid: 120 mL  Total IN: 120 mL    OUT:    Voided (mL): 350 mL  Total OUT: 350 mL    Total NET: -230 mL      General: NAD, resting comfortably in bed  C/V: NSR  Pulm: Nonlabored breathing, no respiratory distress  Chest: Soft, minimally TTP, no surrounding ecchymosis. Axillas soft, minimally TTP. GEO drains to bulb suction with serous fluid.   Abd: soft, nondistended, minimal TTP in LLQ. No rebound or guarding  Extrem: WWP, no edema, SCDs in place  Incisions: dressing intact without strikethrough      LABS:                        10.5   9.23  )-----------( 222      ( 23 Jan 2021 08:08 )             33.3     01-23    138  |  100  |  7   ----------------------------<  122<H>  3.7   |  29  |  0.49<L>    Ca    9.9      23 Jan 2021 08:08  Phos  2.8     01-23  Mg     2.1     01-23

## 2021-01-23 NOTE — PROGRESS NOTE ADULT - SUBJECTIVE AND OBJECTIVE BOX
Plastic Surgery Progress Note (pg [facility]: [id number], [facility]: [phone])    SUBJECTIVE  The patient was seen and examined. No acute events overnight. C/o left side drain site tenderness.    OBJECTIVE  ___________________________________________________  VITAL SIGNS / I&O's   Vital Signs Last 24 Hrs  T(C): 36.9 (23 Jan 2021 04:15), Max: 37.4 (22 Jan 2021 20:15)  T(F): 98.4 (23 Jan 2021 04:15), Max: 99.3 (22 Jan 2021 20:15)  HR: 89 (23 Jan 2021 04:15) (84 - 90)  BP: 162/88 (23 Jan 2021 04:15) (144/80 - 165/89)  BP(mean): --  RR: 17 (23 Jan 2021 04:15) (15 - 17)  SpO2: 94% (23 Jan 2021 04:15) (92% - 97%)      22 Jan 2021 07:01  -  23 Jan 2021 07:00  --------------------------------------------------------  IN:    IV PiggyBack: 50 mL    Lactated Ringers: 720 mL    Oral Fluid: 480 mL  Total IN: 1250 mL    OUT:    Bulb (mL): 117 mL    Bulb (mL): 70 mL    Voided (mL): 3000 mL  Total OUT: 3187 mL    Total NET: -1937 mL        ___________________________________________________  PHYSICAL EXAM    -- CONSTITUTIONAL: NAD  -- NEURO: Awake, alert  -- CHEST: Bilateral reconstructed breast dressing c/d/i, no collection appreciated, drains serosanguinous  -- PULM: Non-labored respirations    ___________________________________________________  LABS                        10.0   9.25  )-----------( 214      ( 22 Jan 2021 07:14 )             30.6     22 Jan 2021 07:14    143    |  107    |  8      ----------------------------<  129    3.9     |  25     |  0.58     Ca    8.9        22 Jan 2021 07:14  Phos  3.2       22 Jan 2021 07:14  Mg     1.7       22 Jan 2021 07:14        CAPILLARY BLOOD GLUCOSE              ___________________________________________________  MICRO  Recent Cultures:    ___________________________________________________  MEDICATIONS  (STANDING):  acetaminophen   Tablet .. 650 milliGRAM(s) Oral every 6 hours  BUpivacaine liposome 1.3% Injectable (no eMAR) 20 milliLiter(s) Local Injection once  heparin   Injectable 5000 Unit(s) SubCutaneous every 8 hours  lactated ringers. 1000 milliLiter(s) (90 mL/Hr) IV Continuous <Continuous>  losartan 25 milliGRAM(s) Oral daily  melatonin 3 milliGRAM(s) Oral at bedtime    MEDICATIONS  (PRN):  ondansetron    Tablet 4 milliGRAM(s) Oral every 6 hours PRN Nausea and/or Vomiting  oxyCODONE    IR 5 milliGRAM(s) Oral every 4 hours PRN Moderate Pain (4 - 6)  oxyCODONE    IR 10 milliGRAM(s) Oral every 6 hours PRN Severe Pain (7 - 10)

## 2021-01-23 NOTE — PROGRESS NOTE ADULT - ASSESSMENT
A/P: 54 yo female s/p B/l Simple mastectomy with Right SLNB + Plastics Reconstruction+ Right Salpingectomy and b/l Oophrectomy  - continue dressings  - continue drain  - vte ppx  - pain control  - Pain Controlled  -dispo per primary and gyn    Plastic Surgery  Pager: [phone]   A/P: 56 yo female s/p B/l Simple mastectomy with Right SLNB + Plastics Reconstruction+ Right Salpingectomy and b/l Oophrectomy  - continue dressings  - continue drain  - vte ppx  - pain control  - Pain Controlled  -dispo per primary and gyn  -please ensure patient is comfortable with drain management and comfortable ambulating prior to discharge, will communicate directly with primary team    Plastic Surgery  Pager: [phone]   A/P: 56 yo female s/p B/l Simple mastectomy with Right SLNB + Plastics Reconstruction+ Right Salpingectomy and b/l Oophrectomy  - continue dressings  - continue drain  - vte ppx  - pain control  -dispo per primary and gyn  -please ensure patient is comfortable with drain management and comfortable ambulating prior to discharge, will communicate directly with primary team    Plastic Surgery  Pager: [phone]

## 2021-01-24 ENCOUNTER — TRANSCRIPTION ENCOUNTER (OUTPATIENT)
Age: 56
End: 2021-01-24

## 2021-01-24 VITALS
DIASTOLIC BLOOD PRESSURE: 78 MMHG | OXYGEN SATURATION: 96 % | TEMPERATURE: 98 F | HEART RATE: 83 BPM | SYSTOLIC BLOOD PRESSURE: 142 MMHG | RESPIRATION RATE: 17 BRPM

## 2021-01-24 LAB
ANION GAP SERPL CALC-SCNC: 12 MMOL/L — SIGNIFICANT CHANGE UP (ref 5–17)
BUN SERPL-MCNC: 7 MG/DL — SIGNIFICANT CHANGE UP (ref 7–23)
CALCIUM SERPL-MCNC: 9.5 MG/DL — SIGNIFICANT CHANGE UP (ref 8.4–10.5)
CHLORIDE SERPL-SCNC: 102 MMOL/L — SIGNIFICANT CHANGE UP (ref 96–108)
CO2 SERPL-SCNC: 25 MMOL/L — SIGNIFICANT CHANGE UP (ref 22–31)
CREAT SERPL-MCNC: 0.54 MG/DL — SIGNIFICANT CHANGE UP (ref 0.5–1.3)
GLUCOSE SERPL-MCNC: 104 MG/DL — HIGH (ref 70–99)
HCT VFR BLD CALC: 34.3 % — LOW (ref 34.5–45)
HGB BLD-MCNC: 10.8 G/DL — LOW (ref 11.5–15.5)
MAGNESIUM SERPL-MCNC: 2 MG/DL — SIGNIFICANT CHANGE UP (ref 1.6–2.6)
MCHC RBC-ENTMCNC: 29.5 PG — SIGNIFICANT CHANGE UP (ref 27–34)
MCHC RBC-ENTMCNC: 31.5 GM/DL — LOW (ref 32–36)
MCV RBC AUTO: 93.7 FL — SIGNIFICANT CHANGE UP (ref 80–100)
NON-GYNECOLOGICAL CYTOLOGY STUDY: SIGNIFICANT CHANGE UP
NRBC # BLD: 0 /100 WBCS — SIGNIFICANT CHANGE UP (ref 0–0)
PHOSPHATE SERPL-MCNC: 3.1 MG/DL — SIGNIFICANT CHANGE UP (ref 2.5–4.5)
PLATELET # BLD AUTO: 238 K/UL — SIGNIFICANT CHANGE UP (ref 150–400)
POTASSIUM SERPL-MCNC: 4 MMOL/L — SIGNIFICANT CHANGE UP (ref 3.5–5.3)
POTASSIUM SERPL-SCNC: 4 MMOL/L — SIGNIFICANT CHANGE UP (ref 3.5–5.3)
RBC # BLD: 3.66 M/UL — LOW (ref 3.8–5.2)
RBC # FLD: 12.9 % — SIGNIFICANT CHANGE UP (ref 10.3–14.5)
SODIUM SERPL-SCNC: 139 MMOL/L — SIGNIFICANT CHANGE UP (ref 135–145)
WBC # BLD: 8.26 K/UL — SIGNIFICANT CHANGE UP (ref 3.8–10.5)
WBC # FLD AUTO: 8.26 K/UL — SIGNIFICANT CHANGE UP (ref 3.8–10.5)

## 2021-01-24 PROCEDURE — 86803 HEPATITIS C AB TEST: CPT

## 2021-01-24 PROCEDURE — 86901 BLOOD TYPING SEROLOGIC RH(D): CPT

## 2021-01-24 PROCEDURE — 88307 TISSUE EXAM BY PATHOLOGIST: CPT

## 2021-01-24 PROCEDURE — 85027 COMPLETE CBC AUTOMATED: CPT

## 2021-01-24 PROCEDURE — 86900 BLOOD TYPING SEROLOGIC ABO: CPT

## 2021-01-24 PROCEDURE — C1789: CPT

## 2021-01-24 PROCEDURE — 80048 BASIC METABOLIC PNL TOTAL CA: CPT

## 2021-01-24 PROCEDURE — 76098 X-RAY EXAM SURGICAL SPECIMEN: CPT

## 2021-01-24 PROCEDURE — 88112 CYTOPATH CELL ENHANCE TECH: CPT

## 2021-01-24 PROCEDURE — 36415 COLL VENOUS BLD VENIPUNCTURE: CPT

## 2021-01-24 PROCEDURE — 85025 COMPLETE CBC W/AUTO DIFF WBC: CPT

## 2021-01-24 PROCEDURE — 88341 IMHCHEM/IMCYTCHM EA ADD ANTB: CPT

## 2021-01-24 PROCEDURE — 88305 TISSUE EXAM BY PATHOLOGIST: CPT

## 2021-01-24 PROCEDURE — 84100 ASSAY OF PHOSPHORUS: CPT

## 2021-01-24 PROCEDURE — 83735 ASSAY OF MAGNESIUM: CPT

## 2021-01-24 PROCEDURE — 86850 RBC ANTIBODY SCREEN: CPT

## 2021-01-24 RX ORDER — CEPHALEXIN 500 MG
1 CAPSULE ORAL
Qty: 40 | Refills: 0
Start: 2021-01-24 | End: 2021-02-02

## 2021-01-24 RX ADMIN — OXYCODONE HYDROCHLORIDE 10 MILLIGRAM(S): 5 TABLET ORAL at 14:21

## 2021-01-24 RX ADMIN — SCOPALAMINE 1 PATCH: 1 PATCH, EXTENDED RELEASE TRANSDERMAL at 05:45

## 2021-01-24 RX ADMIN — Medication 650 MILLIGRAM(S): at 05:32

## 2021-01-24 RX ADMIN — OXYCODONE HYDROCHLORIDE 5 MILLIGRAM(S): 5 TABLET ORAL at 03:59

## 2021-01-24 RX ADMIN — Medication 650 MILLIGRAM(S): at 11:56

## 2021-01-24 RX ADMIN — HEPARIN SODIUM 5000 UNIT(S): 5000 INJECTION INTRAVENOUS; SUBCUTANEOUS at 05:32

## 2021-01-24 RX ADMIN — HEPARIN SODIUM 5000 UNIT(S): 5000 INJECTION INTRAVENOUS; SUBCUTANEOUS at 14:21

## 2021-01-24 NOTE — PROGRESS NOTE ADULT - ASSESSMENT
56 yo female with history of prior breast reduction, abdominoplasty, prior L salpingectomy, and HTN who presents with invasive ductal CA of right breast with focal micropapillary features, at right breat 10:30-11:00, 5-6 cm FN, BRCA2 mutation, presents for bilateral simple mastectomy with right sentinel lymph node biopsy with plastics reconstruction and laparoscopic salpingo-oophorectomy now s/p B/l Simple mastectomy with Right SLNB + Plastics Reconstruction+ Right Salpingectomy and b/l Oophrectomy (1/21/21).    Regular Diet  JPx1 bilaterally, EGO Care instructions, f/u output  F/u AM labs  Plastics, GYN following  AM labs  D/c 1/24 AM with VNS for GEO care

## 2021-01-24 NOTE — PROGRESS NOTE ADULT - ASSESSMENT
A/P: 54 yo female s/p B/l Simple mastectomy with Right SLNB + Plastics Reconstruction+ Right Salpingectomy and b/l Oophrectomy 1/21  - continue dressings  - continue drain  - vte ppx  - pain control  -dispo per primary and gyn  -please ensure patient is comfortable with drain management and comfortable ambulating prior to discharge, directly with primary team  -upon discharge f/u with Dr. French Salcido at plastic surgery clinic in 1 week    Plastic Surgery  Pager: [phone] A/P: 56 yo female s/p B/l Simple mastectomy with Right SLNB + Plastics Reconstruction+ Right Salpingectomy and b/l Oophrectomy 1/21  - continue dressings  - continue drain  - vte ppx  - pain control  -dispo per primary and gyn  -please ensure patient is comfortable with drain management and comfortable ambulating prior to discharge  -upon discharge f/u with Dr. French Salcido at plastic surgery clinic in 1 week    Plastic Surgery  Pager: [phone]

## 2021-01-24 NOTE — PROGRESS NOTE ADULT - SUBJECTIVE AND OBJECTIVE BOX
Plastic Surgery Progress Note (pg [facility]: [id number], [facility]: [phone])    SUBJECTIVE  The patient was seen and examined this morning during rounds. No acute events overnight.    OBJECTIVE  ___________________________________________________  VITAL SIGNS / I&O's   Vital Signs Last 24 Hrs  T(C): 36.7 (24 Jan 2021 05:46), Max: 37.1 (23 Jan 2021 16:21)  T(F): 98 (24 Jan 2021 05:46), Max: 98.7 (23 Jan 2021 16:21)  HR: 72 (24 Jan 2021 05:46) (72 - 84)  BP: 148/79 (24 Jan 2021 05:46) (144/74 - 155/82)  BP(mean): --  RR: 16 (24 Jan 2021 05:46) (16 - 16)  SpO2: 96% (24 Jan 2021 05:46) (95% - 96%)      23 Jan 2021 07:01  -  24 Jan 2021 07:00  --------------------------------------------------------  IN:    IV PiggyBack: 200 mL    Lactated Ringers: 900 mL    Oral Fluid: 720 mL  Total IN: 1820 mL    OUT:    Bulb (mL): 80 mL    Bulb (mL): 65 mL    Voided (mL): 1350 mL  Total OUT: 1495 mL    Total NET: 325 mL        ___________________________________________________  PHYSICAL EXAM    -- CONSTITUTIONAL: NAD  -- NEURO: Awake, alert  -- CHEST: Bilateral reconstructed breast dressing c/d/i, no collection appreciated, drains serosanguinous  -- PULM: Non-labored respirations    ___________________________________________________  LABS                        10.8   8.26  )-----------( 238      ( 24 Jan 2021 07:53 )             34.3     24 Jan 2021 07:53    139    |  102    |  7      ----------------------------<  104    4.0     |  25     |  0.54     Ca    9.5        24 Jan 2021 07:53  Phos  3.1       24 Jan 2021 07:53  Mg     2.0       24 Jan 2021 07:53        CAPILLARY BLOOD GLUCOSE              ___________________________________________________  MICRO  Recent Cultures:    ___________________________________________________  MEDICATIONS  (STANDING):  acetaminophen   Tablet .. 650 milliGRAM(s) Oral every 6 hours  BUpivacaine liposome 1.3% Injectable (no eMAR) 20 milliLiter(s) Local Injection once  heparin   Injectable 5000 Unit(s) SubCutaneous every 8 hours  losartan 25 milliGRAM(s) Oral daily  melatonin 3 milliGRAM(s) Oral at bedtime  scopolamine 1 mG/72 Hr(s) Patch 1 Patch Transdermal every 72 hours    MEDICATIONS  (PRN):  ondansetron Injectable 4 milliGRAM(s) IV Push every 6 hours PRN Nausea and/or Vomiting  oxyCODONE    IR 10 milliGRAM(s) Oral every 6 hours PRN Severe Pain (7 - 10)  oxyCODONE    IR 5 milliGRAM(s) Oral every 4 hours PRN Moderate Pain (4 - 6)   Plastic Surgery Progress Note (pg [facility]: [id number], [facility]: [phone])    SUBJECTIVE  The patient was seen and examined this morning during rounds. No acute events overnight. C/o bilateral drain site tenderness.    OBJECTIVE  ___________________________________________________  VITAL SIGNS / I&O's   Vital Signs Last 24 Hrs  T(C): 36.7 (24 Jan 2021 05:46), Max: 37.1 (23 Jan 2021 16:21)  T(F): 98 (24 Jan 2021 05:46), Max: 98.7 (23 Jan 2021 16:21)  HR: 72 (24 Jan 2021 05:46) (72 - 84)  BP: 148/79 (24 Jan 2021 05:46) (144/74 - 155/82)  BP(mean): --  RR: 16 (24 Jan 2021 05:46) (16 - 16)  SpO2: 96% (24 Jan 2021 05:46) (95% - 96%)      23 Jan 2021 07:01  -  24 Jan 2021 07:00  --------------------------------------------------------  IN:    IV PiggyBack: 200 mL    Lactated Ringers: 900 mL    Oral Fluid: 720 mL  Total IN: 1820 mL    OUT:    Bulb (mL): 80 mL    Bulb (mL): 65 mL    Voided (mL): 1350 mL  Total OUT: 1495 mL    Total NET: 325 mL        ___________________________________________________  PHYSICAL EXAM    -- CONSTITUTIONAL: NAD  -- NEURO: Awake, alert  -- CHEST: Bilateral reconstructed breast dressing c/d/i, no collection appreciated, drains serosanguinous  -- PULM: Non-labored respirations    ___________________________________________________  LABS                        10.8   8.26  )-----------( 238      ( 24 Jan 2021 07:53 )             34.3     24 Jan 2021 07:53    139    |  102    |  7      ----------------------------<  104    4.0     |  25     |  0.54     Ca    9.5        24 Jan 2021 07:53  Phos  3.1       24 Jan 2021 07:53  Mg     2.0       24 Jan 2021 07:53        CAPILLARY BLOOD GLUCOSE              ___________________________________________________  MICRO  Recent Cultures:    ___________________________________________________  MEDICATIONS  (STANDING):  acetaminophen   Tablet .. 650 milliGRAM(s) Oral every 6 hours  BUpivacaine liposome 1.3% Injectable (no eMAR) 20 milliLiter(s) Local Injection once  heparin   Injectable 5000 Unit(s) SubCutaneous every 8 hours  losartan 25 milliGRAM(s) Oral daily  melatonin 3 milliGRAM(s) Oral at bedtime  scopolamine 1 mG/72 Hr(s) Patch 1 Patch Transdermal every 72 hours    MEDICATIONS  (PRN):  ondansetron Injectable 4 milliGRAM(s) IV Push every 6 hours PRN Nausea and/or Vomiting  oxyCODONE    IR 10 milliGRAM(s) Oral every 6 hours PRN Severe Pain (7 - 10)  oxyCODONE    IR 5 milliGRAM(s) Oral every 4 hours PRN Moderate Pain (4 - 6)   Plastic Surgery Progress Note (pg [facility]: [id number], [facility]: [phone])    SUBJECTIVE  The patient was seen and examined this morning during rounds. No acute events overnight. Nausea and PO tolerance improved. Tolerated some soup last night and this morning is tolerating solid food (eggs and toast) so far. Has ambulated in the room but not in the halls yet.  C/o intermittent bilateral drain site tenderness. VNS being established.    OBJECTIVE  ___________________________________________________  VITAL SIGNS / I&O's   Vital Signs Last 24 Hrs  T(C): 36.7 (24 Jan 2021 05:46), Max: 37.1 (23 Jan 2021 16:21)  T(F): 98 (24 Jan 2021 05:46), Max: 98.7 (23 Jan 2021 16:21)  HR: 72 (24 Jan 2021 05:46) (72 - 84)  BP: 148/79 (24 Jan 2021 05:46) (144/74 - 155/82)  BP(mean): --  RR: 16 (24 Jan 2021 05:46) (16 - 16)  SpO2: 96% (24 Jan 2021 05:46) (95% - 96%)      23 Jan 2021 07:01  -  24 Jan 2021 07:00  --------------------------------------------------------  IN:    IV PiggyBack: 200 mL    Lactated Ringers: 900 mL    Oral Fluid: 720 mL  Total IN: 1820 mL    OUT:    Bulb (mL): 80 mL    Bulb (mL): 65 mL    Voided (mL): 1350 mL  Total OUT: 1495 mL    Total NET: 325 mL        ___________________________________________________  PHYSICAL EXAM    -- CONSTITUTIONAL: NAD  -- NEURO: Awake, alert  -- CHEST: Bilateral reconstructed breast dressing c/d/i, no collection appreciated, drains serosanguinous  -- PULM: Non-labored respirations    ___________________________________________________  LABS                        10.8   8.26  )-----------( 238      ( 24 Jan 2021 07:53 )             34.3     24 Jan 2021 07:53    139    |  102    |  7      ----------------------------<  104    4.0     |  25     |  0.54     Ca    9.5        24 Jan 2021 07:53  Phos  3.1       24 Jan 2021 07:53  Mg     2.0       24 Jan 2021 07:53        CAPILLARY BLOOD GLUCOSE              ___________________________________________________  MICRO  Recent Cultures:    ___________________________________________________  MEDICATIONS  (STANDING):  acetaminophen   Tablet .. 650 milliGRAM(s) Oral every 6 hours  BUpivacaine liposome 1.3% Injectable (no eMAR) 20 milliLiter(s) Local Injection once  heparin   Injectable 5000 Unit(s) SubCutaneous every 8 hours  losartan 25 milliGRAM(s) Oral daily  melatonin 3 milliGRAM(s) Oral at bedtime  scopolamine 1 mG/72 Hr(s) Patch 1 Patch Transdermal every 72 hours    MEDICATIONS  (PRN):  ondansetron Injectable 4 milliGRAM(s) IV Push every 6 hours PRN Nausea and/or Vomiting  oxyCODONE    IR 10 milliGRAM(s) Oral every 6 hours PRN Severe Pain (7 - 10)  oxyCODONE    IR 5 milliGRAM(s) Oral every 4 hours PRN Moderate Pain (4 - 6)

## 2021-01-24 NOTE — PROGRESS NOTE ADULT - SUBJECTIVE AND OBJECTIVE BOX
SUBJECTIVE: Patient seen and examined bedside. Resting comfortably in bed. Patient tolerated regular diet better yesterday. Dizziness has improved significantly. Pain well controlled. No other complaints at this time.    PAST MEDICAL & SURGICAL HISTORY:  Constipation    HTN (hypertension)    Breast cancer  right    S/P hernia repair  umbilical    Surgery, elective  liposuction    S/P abdominoplasty  w/ b/l breast reduction      No Known Allergies    MEDICATIONS  (STANDING):  acetaminophen   Tablet .. 650 milliGRAM(s) Oral every 6 hours  BUpivacaine liposome 1.3% Injectable (no eMAR) 20 milliLiter(s) Local Injection once  heparin   Injectable 5000 Unit(s) SubCutaneous every 8 hours  losartan 25 milliGRAM(s) Oral daily  melatonin 3 milliGRAM(s) Oral at bedtime  scopolamine 1 mG/72 Hr(s) Patch 1 Patch Transdermal every 72 hours    MEDICATIONS  (PRN):  ondansetron Injectable 4 milliGRAM(s) IV Push every 6 hours PRN Nausea and/or Vomiting  oxyCODONE    IR 10 milliGRAM(s) Oral every 6 hours PRN Severe Pain (7 - 10)  oxyCODONE    IR 5 milliGRAM(s) Oral every 4 hours PRN Moderate Pain (4 - 6)      Objective    ICU Vital Signs Last 24 Hrs  T(C): 36.7 (24 Jan 2021 05:46), Max: 37.1 (23 Jan 2021 16:21)  T(F): 98 (24 Jan 2021 05:46), Max: 98.7 (23 Jan 2021 16:21)  HR: 72 (24 Jan 2021 05:46) (72 - 84)  BP: 148/79 (24 Jan 2021 05:46) (144/74 - 155/82)  RR: 16 (24 Jan 2021 05:46) (16 - 16)  SpO2: 96% (24 Jan 2021 05:46) (95% - 96%)        General: NAD, resting comfortably in bed  C/V: NSR  Pulm: Nonlabored breathing, no respiratory distress  Chest: Soft, minimally TTP, no surrounding ecchymosis. Axillas soft, minimally TTP. GEO drains to bulb suction with serous fluid.   Abd: soft, nondistended, minimal TTP in LLQ. No rebound or guarding  Extrem: WWP, no edema, SCDs in place  Incisions: dressing intact without strikethrough                            10.8   8.26  )-----------( 238      ( 24 Jan 2021 07:53 )             34.3     01-24    139  |  102  |  7   ----------------------------<  104<H>  4.0   |  25  |  0.54    Ca    9.5      24 Jan 2021 07:53  Phos  3.1     01-24  Mg     2.0     01-24        I&O's Summary    23 Jan 2021 07:01  -  24 Jan 2021 07:00  --------------------------------------------------------  IN: 1820 mL / OUT: 1495 mL / NET: 325 mL

## 2021-01-26 ENCOUNTER — OUTPATIENT (OUTPATIENT)
Dept: OUTPATIENT SERVICES | Facility: HOSPITAL | Age: 56
LOS: 1 days | End: 2021-01-26

## 2021-01-26 ENCOUNTER — APPOINTMENT (OUTPATIENT)
Dept: PLASTIC SURGERY | Facility: CLINIC | Age: 56
End: 2021-01-26

## 2021-01-26 DIAGNOSIS — Z41.9 ENCOUNTER FOR PROCEDURE FOR PURPOSES OTHER THAN REMEDYING HEALTH STATE, UNSPECIFIED: Chronic | ICD-10-CM

## 2021-01-26 DIAGNOSIS — Z98.890 OTHER SPECIFIED POSTPROCEDURAL STATES: Chronic | ICD-10-CM

## 2021-01-26 DIAGNOSIS — I10 ESSENTIAL (PRIMARY) HYPERTENSION: ICD-10-CM

## 2021-01-26 DIAGNOSIS — Z78.9 OTHER SPECIFIED HEALTH STATUS: ICD-10-CM

## 2021-01-26 PROBLEM — C50.919 MALIGNANT NEOPLASM OF UNSPECIFIED SITE OF UNSPECIFIED FEMALE BREAST: Chronic | Status: ACTIVE | Noted: 2021-01-20

## 2021-01-26 PROBLEM — K59.00 CONSTIPATION, UNSPECIFIED: Chronic | Status: ACTIVE | Noted: 2021-01-20

## 2021-01-26 LAB — SURGICAL PATHOLOGY STUDY: SIGNIFICANT CHANGE UP

## 2021-01-27 VITALS — HEIGHT: 64 IN | BODY MASS INDEX: 27.31 KG/M2 | TEMPERATURE: 98.2 F | WEIGHT: 160 LBS | RESPIRATION RATE: 15 BRPM

## 2021-01-27 PROBLEM — Z78.9 NON-SMOKER: Status: ACTIVE | Noted: 2021-01-27

## 2021-01-27 PROBLEM — Z78.9 DOES NOT USE ILLICIT DRUGS: Status: ACTIVE | Noted: 2021-01-27

## 2021-01-27 PROBLEM — I10 HYPERTENSION: Status: ACTIVE | Noted: 2021-01-27

## 2021-01-27 NOTE — REASON FOR VISIT
[Follow-Up: _____] : a [unfilled] follow-up visit [Spouse] : spouse [FreeTextEntry1] : [employer] use # [id number]

## 2021-01-28 DIAGNOSIS — Z09 ENCOUNTER FOR FOLLOW-UP EXAMINATION AFTER COMPLETED TREATMENT FOR CONDITIONS OTHER THAN MALIGNANT NEOPLASM: ICD-10-CM

## 2021-01-29 DIAGNOSIS — Z15.01 GENETIC SUSCEPTIBILITY TO MALIGNANT NEOPLASM OF BREAST: ICD-10-CM

## 2021-01-29 DIAGNOSIS — N73.6 FEMALE PELVIC PERITONEAL ADHESIONS (POSTINFECTIVE): ICD-10-CM

## 2021-01-29 DIAGNOSIS — I10 ESSENTIAL (PRIMARY) HYPERTENSION: ICD-10-CM

## 2021-01-29 DIAGNOSIS — Z15.02 GENETIC SUSCEPTIBILITY TO MALIGNANT NEOPLASM OF OVARY: ICD-10-CM

## 2021-01-29 DIAGNOSIS — C50.211 MALIGNANT NEOPLASM OF UPPER-INNER QUADRANT OF RIGHT FEMALE BREAST: ICD-10-CM

## 2021-01-29 DIAGNOSIS — Z40.02 ENCOUNTER FOR PROPHYLACTIC REMOVAL OF OVARY(S): ICD-10-CM

## 2021-01-29 DIAGNOSIS — Z90.79 ACQUIRED ABSENCE OF OTHER GENITAL ORGAN(S): ICD-10-CM

## 2021-02-04 ENCOUNTER — APPOINTMENT (OUTPATIENT)
Dept: PLASTIC SURGERY | Facility: CLINIC | Age: 56
End: 2021-02-04

## 2021-02-04 ENCOUNTER — OUTPATIENT (OUTPATIENT)
Dept: OUTPATIENT SERVICES | Facility: HOSPITAL | Age: 56
LOS: 1 days | End: 2021-02-04

## 2021-02-04 DIAGNOSIS — Z98.890 OTHER SPECIFIED POSTPROCEDURAL STATES: Chronic | ICD-10-CM

## 2021-02-04 DIAGNOSIS — Z41.9 ENCOUNTER FOR PROCEDURE FOR PURPOSES OTHER THAN REMEDYING HEALTH STATE, UNSPECIFIED: Chronic | ICD-10-CM

## 2021-02-05 DIAGNOSIS — Z09 ENCOUNTER FOR FOLLOW-UP EXAMINATION AFTER COMPLETED TREATMENT FOR CONDITIONS OTHER THAN MALIGNANT NEOPLASM: ICD-10-CM

## 2021-02-11 ENCOUNTER — APPOINTMENT (OUTPATIENT)
Dept: PLASTIC SURGERY | Facility: CLINIC | Age: 56
End: 2021-02-11

## 2021-02-11 ENCOUNTER — OUTPATIENT (OUTPATIENT)
Dept: OUTPATIENT SERVICES | Facility: HOSPITAL | Age: 56
LOS: 1 days | End: 2021-02-11

## 2021-02-11 DIAGNOSIS — Z98.890 OTHER SPECIFIED POSTPROCEDURAL STATES: Chronic | ICD-10-CM

## 2021-02-11 DIAGNOSIS — Z41.9 ENCOUNTER FOR PROCEDURE FOR PURPOSES OTHER THAN REMEDYING HEALTH STATE, UNSPECIFIED: Chronic | ICD-10-CM

## 2021-02-12 DIAGNOSIS — Z09 ENCOUNTER FOR FOLLOW-UP EXAMINATION AFTER COMPLETED TREATMENT FOR CONDITIONS OTHER THAN MALIGNANT NEOPLASM: ICD-10-CM

## 2021-02-12 NOTE — HISTORY OF PRESENT ILLNESS
[FreeTextEntry1] : PLASTIC SURGERY FOLLOW UP\par \par ID\par 56 yo old female status post bilateral TE insertion following skin sparing mastectomies. \par \par Current Volume= 300cc R 300ccL\par Todays fill volume= 100cc R 100ccL\par Total volume as of February 11th 2021= 400cc R 400cc L\par \par PLAN\par She tolerated the fill very well today in clinic. I also removed both of her GEO drains which she also tolerated very well. \par \par We will follow up with her in 2 weeks for her next fill. \par \par Dictated by French Salcido (Plastic Surgeon)

## 2021-02-18 ENCOUNTER — APPOINTMENT (OUTPATIENT)
Dept: PLASTIC SURGERY | Facility: CLINIC | Age: 56
End: 2021-02-18

## 2021-02-25 ENCOUNTER — APPOINTMENT (OUTPATIENT)
Dept: PLASTIC SURGERY | Facility: CLINIC | Age: 56
End: 2021-02-25

## 2021-02-25 ENCOUNTER — OUTPATIENT (OUTPATIENT)
Dept: OUTPATIENT SERVICES | Facility: HOSPITAL | Age: 56
LOS: 1 days | End: 2021-02-25

## 2021-02-25 DIAGNOSIS — Z98.890 OTHER SPECIFIED POSTPROCEDURAL STATES: Chronic | ICD-10-CM

## 2021-02-25 DIAGNOSIS — Z41.9 ENCOUNTER FOR PROCEDURE FOR PURPOSES OTHER THAN REMEDYING HEALTH STATE, UNSPECIFIED: Chronic | ICD-10-CM

## 2021-02-25 NOTE — HISTORY OF PRESENT ILLNESS
[FreeTextEntry1] : PLASTIC SURGERY PROGRESS NOTE\par \par ID\par \par 54 yo female status post bilateral TE insertion after skin sparing mastectomies. \par \par Current Volume- 400cc R and 400cc L\par Todays Fill Volume- 120cc R and 120cc L\par Total Volume as of February 25 2021- 520cc R and 520cc L\par \par PLAN\par She tolerated the fill today very well. I will see her next week for another 120cc fill to each side. 1 or 2 more fills are warranted. \par \par Looking forward to seeing her next week. \par \par Dictated by French Salcido (Plastic Surgeon)

## 2021-02-25 NOTE — HISTORY OF PRESENT ILLNESS
[FreeTextEntry1] : PLASTIC SURGERY PROGRESS NOTE\par \par ID\par \par 56 yo female status post bilateral TE insertion after skin sparing mastectomies. \par \par Current Volume- 400cc R and 400cc L\par Todays Fill Volume- 120cc R and 120cc L\par Total Volume as of February 25 2021- 520cc R and 520cc L\par \par PLAN\par She tolerated the fill today very well. I will see her next week for another 120cc fill to each side. 1 or 2 more fills are warranted. \par \par Looking forward to seeing her next week. \par \par Dictated by French Salcido (Plastic Surgeon)

## 2021-03-01 DIAGNOSIS — Z48.812 ENCOUNTER FOR SURGICAL AFTERCARE FOLLOWING SURGERY ON THE CIRCULATORY SYSTEM: ICD-10-CM

## 2021-03-04 ENCOUNTER — APPOINTMENT (OUTPATIENT)
Dept: PLASTIC SURGERY | Facility: CLINIC | Age: 56
End: 2021-03-04

## 2021-03-04 ENCOUNTER — OUTPATIENT (OUTPATIENT)
Dept: OUTPATIENT SERVICES | Facility: HOSPITAL | Age: 56
LOS: 1 days | End: 2021-03-04

## 2021-03-04 DIAGNOSIS — Z98.890 OTHER SPECIFIED POSTPROCEDURAL STATES: Chronic | ICD-10-CM

## 2021-03-04 DIAGNOSIS — Z41.9 ENCOUNTER FOR PROCEDURE FOR PURPOSES OTHER THAN REMEDYING HEALTH STATE, UNSPECIFIED: Chronic | ICD-10-CM

## 2021-03-04 NOTE — HISTORY OF PRESENT ILLNESS
[FreeTextEntry1] : PLASTIC SURGERY PROGRESS NOTE\par \par ID\par Jewel is a 56 yo female status post bilateral skin sparing mastectomy and TE insertion\par \par PROGRESS\par Current Volume 520cc R and 520cc L\par Todays Fill Volume 120cc R and 120cc L\par Total Volume as of March 4th 2021: 640cc R and 640cc L\par \par PLAN\par The patient tolerated the fills today very well. She is currently at her final volume and we will schedule her TE to implant exchange in 1 month. \par \par Dictated by French Salcido

## 2021-03-05 DIAGNOSIS — Z48.812 ENCOUNTER FOR SURGICAL AFTERCARE FOLLOWING SURGERY ON THE CIRCULATORY SYSTEM: ICD-10-CM

## 2021-05-18 ENCOUNTER — TRANSCRIPTION ENCOUNTER (OUTPATIENT)
Age: 56
End: 2021-05-18

## 2021-05-19 ENCOUNTER — RESULT REVIEW (OUTPATIENT)
Age: 56
End: 2021-05-19

## 2021-05-19 ENCOUNTER — OUTPATIENT (OUTPATIENT)
Dept: OUTPATIENT SERVICES | Facility: HOSPITAL | Age: 56
LOS: 1 days | Discharge: HOME | End: 2021-05-19
Payer: MEDICAID

## 2021-05-19 DIAGNOSIS — Z41.9 ENCOUNTER FOR PROCEDURE FOR PURPOSES OTHER THAN REMEDYING HEALTH STATE, UNSPECIFIED: Chronic | ICD-10-CM

## 2021-05-19 DIAGNOSIS — Z98.890 OTHER SPECIFIED POSTPROCEDURAL STATES: Chronic | ICD-10-CM

## 2021-05-19 PROCEDURE — 88305 TISSUE EXAM BY PATHOLOGIST: CPT | Mod: 26

## 2021-05-19 RX ORDER — CEPHALEXIN 500 MG
1 CAPSULE ORAL
Qty: 40 | Refills: 0
Start: 2021-05-19 | End: 2021-05-28

## 2021-05-19 RX ORDER — HYDROMORPHONE HYDROCHLORIDE 2 MG/ML
1 INJECTION INTRAMUSCULAR; INTRAVENOUS; SUBCUTANEOUS
Qty: 10 | Refills: 0
Start: 2021-05-19

## 2021-05-24 LAB — SURGICAL PATHOLOGY STUDY: SIGNIFICANT CHANGE UP

## 2021-05-27 ENCOUNTER — APPOINTMENT (OUTPATIENT)
Dept: PLASTIC SURGERY | Facility: CLINIC | Age: 56
End: 2021-05-27

## 2021-05-27 ENCOUNTER — OUTPATIENT (OUTPATIENT)
Dept: OUTPATIENT SERVICES | Facility: HOSPITAL | Age: 56
LOS: 1 days | End: 2021-05-27

## 2021-05-27 DIAGNOSIS — Z98.890 OTHER SPECIFIED POSTPROCEDURAL STATES: Chronic | ICD-10-CM

## 2021-05-27 DIAGNOSIS — Z41.9 ENCOUNTER FOR PROCEDURE FOR PURPOSES OTHER THAN REMEDYING HEALTH STATE, UNSPECIFIED: Chronic | ICD-10-CM

## 2021-05-27 NOTE — HISTORY OF PRESENT ILLNESS
[FreeTextEntry1] : PLASTIC SURGERY FOLLOW UP\par \par Smitha is 1 week post 2nd stage alloplastic breast reconstruction. \par \par She is healing uneventfully. Her incisions are well healed with no sign of infection. She no longer has any pain and says she is doing very well. \par \par I encouraged her to keep showering every day and to follow up with me in 2 weeks for an incision check. I have no concerns at this time. \par \par Dr. French Salcido

## 2021-05-28 DIAGNOSIS — Z09 ENCOUNTER FOR FOLLOW-UP EXAMINATION AFTER COMPLETED TREATMENT FOR CONDITIONS OTHER THAN MALIGNANT NEOPLASM: ICD-10-CM

## 2021-06-17 ENCOUNTER — APPOINTMENT (OUTPATIENT)
Dept: PLASTIC SURGERY | Facility: CLINIC | Age: 56
End: 2021-06-17

## 2021-06-17 ENCOUNTER — OUTPATIENT (OUTPATIENT)
Dept: OUTPATIENT SERVICES | Facility: HOSPITAL | Age: 56
LOS: 1 days | End: 2021-06-17

## 2021-06-17 DIAGNOSIS — Z98.890 OTHER SPECIFIED POSTPROCEDURAL STATES: Chronic | ICD-10-CM

## 2021-06-17 DIAGNOSIS — Z41.9 ENCOUNTER FOR PROCEDURE FOR PURPOSES OTHER THAN REMEDYING HEALTH STATE, UNSPECIFIED: Chronic | ICD-10-CM

## 2021-06-17 NOTE — HISTORY OF PRESENT ILLNESS
[FreeTextEntry1] : Smitha underwent 2 stage alloplastic breast reconstruction and is 1 months post implant exchange. \par \par She is doing great. Her incisions are fully healed and she has great symmetry. I went through with her the plan for nipple reconstruction but we will wait another 3 months before proceeding.\par \par She indicated that she understood daya plan. \par \par I will follow up with her on a PRN basis. \par \par Dictated by French Salcido (fellow)\par \par

## 2021-06-18 DIAGNOSIS — Z09 ENCOUNTER FOR FOLLOW-UP EXAMINATION AFTER COMPLETED TREATMENT FOR CONDITIONS OTHER THAN MALIGNANT NEOPLASM: ICD-10-CM

## 2024-02-27 NOTE — DISCHARGE NOTE NURSING/CASE MANAGEMENT/SOCIAL WORK - PATIENT PORTAL LINK FT
Please schedule a pap smear   
You can access the FollowMyHealth Patient Portal offered by [facility] by registering at the following website: [email or web address]. By joining [employer]’s FollowMyHealth portal, you will also be able to view your health information using other applications (apps) compatible with our system.

## 2024-11-26 NOTE — DISCHARGE NOTE PROVIDER - NSDCFUADDINST_GEN_ALL_CORE_FT
Please keep your dressing in place until follow up.    You may shower with your dressing in place. Shower with your back toward the shower head. You can let the water run down onto your dressings, but do not scrub or rub your dressings. Pat the surgical sites dry. Do not fully submerge in water.     You have a surgical drain in place. Please empty the drain twice daily and record the output for each drain as instructed.    Take medications as prescribed.    Please follow up with Dr. French Salcido in 1 week.   Please keep your dressing in place until follow up.    You may shower with your dressing in place. Shower with your back toward the shower head. You can let the water run down onto your dressings, but do not scrub or rub your dressings. Pat the surgical sites dry. Do not fully submerge in water.     You have a surgical drain in place. Please empty the drain twice daily and record the output for each drain as instructed.    Take medications as prescribed.    Please follow up with Dr. French Salcido in 1 week.    Warning Signs:  Please call your doctor or nurse practitioner if you experience the following:  *You experience new chest pain, pressure, squeezing or tightness.  *New or worsening cough, shortness of breath, or wheeze.  *If you are vomiting and cannot keep down fluids or your medications.  *You are getting dehydrated due to continued vomiting, diarrhea, or other reasons. Signs of dehydration include dry mouth, rapid heartbeat, or feeling dizzy or faint when standing.  *You experience burning when you urinate, have blood in your urine, or experience a discharge.  *Your pain is not improving within 8-12 hours or is not gone within 24 hours.  *You have shaking chills, or fever greater than 101.5 degrees Fahrenheit or 38 degrees Celsius.  *Any change in your symptoms, or any new symptoms that concern you.   used

## 2025-06-18 ENCOUNTER — APPOINTMENT (OUTPATIENT)
Dept: PULMONOLOGY | Facility: CLINIC | Age: 60
End: 2025-06-18

## 2025-06-18 VITALS
HEART RATE: 84 BPM | WEIGHT: 193 LBS | DIASTOLIC BLOOD PRESSURE: 81 MMHG | OXYGEN SATURATION: 96 % | HEIGHT: 64 IN | SYSTOLIC BLOOD PRESSURE: 131 MMHG | RESPIRATION RATE: 16 BRPM | TEMPERATURE: 97.7 F | BODY MASS INDEX: 32.95 KG/M2

## 2025-06-18 PROCEDURE — 94060 EVALUATION OF WHEEZING: CPT

## 2025-06-18 PROCEDURE — 99204 OFFICE O/P NEW MOD 45 MIN: CPT | Mod: 25

## 2025-06-18 PROCEDURE — 94729 DIFFUSING CAPACITY: CPT

## 2025-06-18 PROCEDURE — 94727 GAS DIL/WSHOT DETER LNG VOL: CPT

## 2025-06-18 RX ORDER — ALBUTEROL SULFATE 90 UG/1
108 (90 BASE) INHALANT RESPIRATORY (INHALATION)
Qty: 1 | Refills: 3 | Status: ACTIVE | COMMUNITY
Start: 2025-06-18 | End: 1900-01-01

## 2025-06-18 RX ORDER — AMLODIPINE BESYLATE 5 MG/1
TABLET ORAL
Refills: 0 | Status: ACTIVE | COMMUNITY

## 2025-06-18 RX ORDER — LOSARTAN POTASSIUM 100 MG/1
TABLET, FILM COATED ORAL
Refills: 0 | Status: ACTIVE | COMMUNITY

## 2025-06-18 RX ORDER — LETROZOLE TABLETS 2.5 MG/1
TABLET, FILM COATED ORAL
Refills: 0 | Status: ACTIVE | COMMUNITY